# Patient Record
Sex: MALE | Race: AMERICAN INDIAN OR ALASKA NATIVE
[De-identification: names, ages, dates, MRNs, and addresses within clinical notes are randomized per-mention and may not be internally consistent; named-entity substitution may affect disease eponyms.]

---

## 2018-05-13 ENCOUNTER — HOSPITAL ENCOUNTER (EMERGENCY)
Dept: HOSPITAL 5 - ED | Age: 48
Discharge: HOME | End: 2018-05-13
Payer: SELF-PAY

## 2018-05-13 VITALS — SYSTOLIC BLOOD PRESSURE: 137 MMHG | DIASTOLIC BLOOD PRESSURE: 90 MMHG
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
## 2018-05-13 DIAGNOSIS — S61.412A: Primary | ICD-10-CM

## 2018-05-13 PROCEDURE — 90715 TDAP VACCINE 7 YRS/> IM: CPT

## 2018-05-13 PROCEDURE — A6250 SKIN SEAL PROTECT MOISTURIZR: HCPCS

## 2018-05-13 PROCEDURE — 90471 IMMUNIZATION ADMIN: CPT

## 2018-05-13 NOTE — EMERGENCY DEPARTMENT REPORT
- General


Chief Complaint: Wound/Laceration


Stated Complaint: LEFT HAND LACERATION


Time Seen by Provider: 05/13/18 21:08


Source: patient, family


Mode of arrival: Ambulatory


Limitations: No Limitations





- History of Present Illness


Initial Comments: 


47-year-old male past medical history none presents with complaint of 

laceration to back of left hand.  Patient states that while at work he was 

washing materials and a partner handed him a sharp metal object that is used to 

cut meat.  Patient states he cut the back of his left hand when it slipped.  He 

reports no other injuries sustained.  Patient is awake alert and oriented 3 

not in acute distress.  Does not know last tetanus vaccine update


-: This evening


Extremity Location: Left: Hand (back of left hand)


Place: work


Patient Tetanus UTD: No


Context: accidental


Associated Symptoms: pain





- Related Data


 Previous Rx's











 Medication  Instructions  Recorded  Last Taken  Type


 


Cephalexin [Keflex] 500 mg PO Q12HR #14 cap 05/13/18 Unknown Rx


 


Ibuprofen [Motrin] 600 mg PO Q8H PRN #20 tablet 05/13/18 Unknown Rx











 Allergies











Allergy/AdvReac Type Severity Reaction Status Date / Time


 


No Known Allergies Allergy   Verified 05/13/18 19:54














ED Review of Systems


ROS: 


Stated complaint: LEFT HAND LACERATION


Other details as noted in HPI





Constitutional: denies: chills, fever


Eyes: denies: eye pain, eye discharge, vision change


ENT: denies: ear pain, throat pain


Respiratory: denies: cough, shortness of breath, wheezing


Cardiovascular: denies: chest pain, palpitations


Endocrine: no symptoms reported


Gastrointestinal: denies: abdominal pain, nausea, diarrhea


Genitourinary: denies: urgency, dysuria


Musculoskeletal: denies: back pain, joint swelling, arthralgia


Skin: denies: rash, lesions


Neurological: denies: headache, weakness, paresthesias


Psychiatric: denies: anxiety, depression


Hematological/Lymphatic: denies: easy bleeding, easy bruising





ED Past Medical Hx





- Past Medical History


Previous Medical History?: No





- Surgical History


Past Surgical History?: No





- Social History


Smoking Status: Never Smoker


Substance Use Type: None





- Medications


Home Medications: 


 Home Medications











 Medication  Instructions  Recorded  Confirmed  Last Taken  Type


 


Cephalexin [Keflex] 500 mg PO Q12HR #14 cap 05/13/18  Unknown Rx


 


Ibuprofen [Motrin] 600 mg PO Q8H PRN #20 tablet 05/13/18  Unknown Rx














ED Physical Exam





- General


Limitations: No Limitations


General appearance: alert, in no apparent distress





- Head


Head exam: Present: atraumatic, normocephalic





- Eye


Eye exam: Present: normal appearance, PERRL, EOMI





- ENT


ENT exam: Present: mucous membranes moist





- Neck


Neck exam: Present: normal inspection





- Respiratory


Respiratory exam: Present: normal lung sounds bilaterally.  Absent: respiratory 

distress





- Cardiovascular


Cardiovascular Exam: Present: regular rate, normal rhythm.  Absent: systolic 

murmur, diastolic murmur, rubs, gallop





- GI/Abdominal


GI/Abdominal exam: Present: soft, normal bowel sounds





- Rectal


Rectal exam: Present: deferred





- Extremities Exam


Extremities exam: Present: normal inspection





- Expanded Upper Extremity Exam


  ** Left


Upper Arm exam: Present: normal inspection, full ROM


Elbow exam: Present: normal inspection, full ROM


Forearm Wrist exam: Present: normal inspection, full ROM


Hand Wrist exam: Present: tenderness, laceration


Hand L/R Back: 


  __________________________














  __________________________





 1 - Horizontal laceration here approximately 4-5 cm in length





Neuro motor exam: Present: wrist extension intact, thumb opposition intact, 

thumb IP flexion intact, thumb adduction intact, fingers 2-5 abduction intact


Neurosensory exam: Present: radial nerve intact, ulnar nerve intact, median 

nerve intact


Vascular: Present: normal capillary refill, radial pulse, brachial pulse, ulnar 

pulse





- Back Exam


Back exam: Present: normal inspection





- Neurological Exam


Neurological exam: Present: alert, oriented X3, CN II-XII intact, normal gait





- Psychiatric


Psychiatric exam: Present: normal affect, normal mood





- Skin


Skin exam: Present: warm, dry, intact, normal color.  Absent: rash





ED Course





 Vital Signs











  05/13/18





  19:46


 


Temperature 98.0 F


 


Pulse Rate 90


 


Respiratory 16





Rate 


 


Blood Pressure 137/90


 


O2 Sat by Pulse 96





Oximetry 














- Laceration /Wound Repair


  ** Left Distal Hand


Wound Location: upper extremity (left distal hand back of hand)


Wound Length (cm): 5


Wound's Depth, Shape: linear


Irrigated w/ Saline (ccs): 500


Betadine Prep?: Yes


Anesthesia: 1% Lidocaine


Volume Anesthetic (ccs): 6


Wound Debrided: minimal


Wound Repaired With: sutures


Suture Size/Type: 3:0, proline


Number of Sutures: 7


Layer Closure?: No


Sterile Dressing Applied?: Yes (antibiotic ointment and nonadhesive gauze and 

gauze wrap placed on top)





ED Medical Decision Making





- Medical Decision Making


A/P: Left hand laceration


1- ROM fingers and hand clinically intact. sutures to be removed in 7-10 days


2- tetanus updated today


3- Motrin when necessary, triple antibiotic ointment, short course keflex


4- pt advised to return to the ED for any fevers chills pus drainage or 

erythema at site of laceration





Critical care attestation.: 


If time is entered above; I have spent that time in minutes in the direct care 

of this critically ill patient, excluding procedure time.








ED Disposition


Clinical Impression: 


Laceration of left hand


Qualifiers:


 Encounter type: initial encounter Foreign body presence: without foreign body 

Qualified Code(s): S61.412A - Laceration without foreign body of left hand, 

initial encounter





Disposition: DC-01 TO HOME OR SELFCARE


Is pt being admited?: No


Does the pt Need Aspirin: No


Condition: Stable


Instructions:  Laceration (ED), Suture Care (ED)


Additional Instructions: 


Sutures to be removed in 7-10 days


Prescriptions: 


Cephalexin [Keflex] 500 mg PO Q12HR #14 cap


Ibuprofen [Motrin] 600 mg PO Q8H PRN #20 tablet


 PRN Reason: Pain


Referrals: 


[facility] [Provider Group] - 3-5 Days


Forms:  Work/School Release Form(ED)


Time of Disposition: 22:20

## 2018-05-23 ENCOUNTER — HOSPITAL ENCOUNTER (EMERGENCY)
Dept: HOSPITAL 5 - ED | Age: 48
Discharge: HOME | End: 2018-05-23
Payer: SELF-PAY

## 2018-05-23 VITALS — SYSTOLIC BLOOD PRESSURE: 145 MMHG | DIASTOLIC BLOOD PRESSURE: 80 MMHG

## 2018-05-23 DIAGNOSIS — Z48.02: Primary | ICD-10-CM

## 2018-05-23 PROCEDURE — 99282 EMERGENCY DEPT VISIT SF MDM: CPT

## 2018-05-23 NOTE — EMERGENCY DEPARTMENT REPORT
Suture/Staple Removal





- [facility]


Chief Complaint: Laceration/Recheck/Suture


Stated Complaint: SUTURE REMOVAL


Time Seen by Provider: 05/23/18 11:38


When Sutures or Staples Placed: 8-10 Days Ago


Wound Location: back of left hand





ED Review of Systems


ROS: 


Stated complaint: SUTURE REMOVAL


Other details as noted in HPI





Constitutional: denies: chills, fever


Eyes: denies: eye pain, eye discharge, vision change


ENT: denies: ear pain, throat pain


Respiratory: denies: cough, shortness of breath, wheezing


Cardiovascular: denies: chest pain, palpitations


Endocrine: no symptoms reported


Gastrointestinal: denies: abdominal pain, nausea, diarrhea


Genitourinary: denies: urgency, dysuria


Musculoskeletal: denies: back pain, joint swelling, arthralgia


Skin: denies: rash, lesions


Neurological: denies: headache, weakness, paresthesias


Psychiatric: denies: anxiety, depression


Hematological/Lymphatic: denies: easy bleeding, easy bruising





ED Past Medical Hx





- Past Medical History


Previous Medical History?: No





- Surgical History


Past Surgical History?: No





- Social History


Smoking Status: Never Smoker


Substance Use Type: None





- Medications


Home Medications: 


 Home Medications











 Medication  Instructions  Recorded  Confirmed  Last Taken  Type


 


Cephalexin [Keflex] 500 mg PO Q12HR #14 cap 05/13/18  Unknown Rx


 


Ibuprofen [Motrin] 600 mg PO Q8H PRN #20 tablet 05/13/18  Unknown Rx














Suture Removal Exam





- Exam


General: 


Vital signs noted. No distress. Alert and acting appropriately.





Wound: No Pathologic Erythema, No Tenderness, No Drainage, No Pus, No Wound 

Dehiscence


Other Systems: 


All other systems reviewed and are unremarkable.








ED Course


 Vital Signs











  05/23/18





  11:17


 


Temperature 97.7 F


 


Pulse Rate 85


 


Respiratory 18





Rate 


 


Blood Pressure 145/80


 


O2 Sat by Pulse 99





Oximetry 














ED Recheck MDM





- Differential Diagnosis


Suture/Staple Removal





- Medical Decision Making


A/P: Suture removal back of left hand


1-wound appears clean and well-healed no signs of purulent drainage or 

cellulitis


2-tiny dehiscence at edges of the wound however central wound appears to have 

closed well


3-sutures removed without difficulty, Steri-Strips placed at edges of wound.  

Patient instructed on acute wound care


 


Critical care attestation.: 


If time is entered above; I have spent that time in minutes in the direct care 

of this critically ill patient, excluding procedure time.








ED Disposition


Clinical Impression: 


 Visit for suture removal





Disposition: DC-01 TO HOME OR SELFCARE


Is pt being admited?: No


Does the pt Need Aspirin: No


Condition: Stable


Instructions:  Suture Removal (ED)


Referrals: 


[facility] [Outside] - 3-5 Days


Time of Disposition: 11:40